# Patient Record
Sex: MALE | Race: OTHER | HISPANIC OR LATINO | URBAN - METROPOLITAN AREA
[De-identification: names, ages, dates, MRNs, and addresses within clinical notes are randomized per-mention and may not be internally consistent; named-entity substitution may affect disease eponyms.]

---

## 2024-02-14 ENCOUNTER — EMERGENCY (EMERGENCY)
Facility: HOSPITAL | Age: 34
LOS: 1 days | Discharge: HOME | End: 2024-02-14
Attending: EMERGENCY MEDICINE | Admitting: EMERGENCY MEDICINE
Payer: SELF-PAY

## 2024-02-14 VITALS
DIASTOLIC BLOOD PRESSURE: 74 MMHG | TEMPERATURE: 98 F | HEART RATE: 68 BPM | OXYGEN SATURATION: 99 % | RESPIRATION RATE: 16 BRPM | SYSTOLIC BLOOD PRESSURE: 128 MMHG

## 2024-02-14 VITALS
OXYGEN SATURATION: 100 % | HEART RATE: 69 BPM | SYSTOLIC BLOOD PRESSURE: 115 MMHG | DIASTOLIC BLOOD PRESSURE: 55 MMHG | RESPIRATION RATE: 16 BRPM | TEMPERATURE: 98 F

## 2024-02-14 LAB
ALBUMIN SERPL ELPH-MCNC: 4.2 G/DL — SIGNIFICANT CHANGE UP (ref 3.3–5)
ALP SERPL-CCNC: 78 U/L — SIGNIFICANT CHANGE UP (ref 40–120)
ALT FLD-CCNC: 16 U/L — SIGNIFICANT CHANGE UP (ref 4–41)
ANION GAP SERPL CALC-SCNC: 11 MMOL/L — SIGNIFICANT CHANGE UP (ref 7–14)
AST SERPL-CCNC: 10 U/L — SIGNIFICANT CHANGE UP (ref 4–40)
BASOPHILS # BLD AUTO: 0.05 K/UL — SIGNIFICANT CHANGE UP (ref 0–0.2)
BASOPHILS NFR BLD AUTO: 0.4 % — SIGNIFICANT CHANGE UP (ref 0–2)
BILIRUB SERPL-MCNC: 0.6 MG/DL — SIGNIFICANT CHANGE UP (ref 0.2–1.2)
BUN SERPL-MCNC: 17 MG/DL — SIGNIFICANT CHANGE UP (ref 7–23)
CALCIUM SERPL-MCNC: 9.1 MG/DL — SIGNIFICANT CHANGE UP (ref 8.4–10.5)
CHLORIDE SERPL-SCNC: 105 MMOL/L — SIGNIFICANT CHANGE UP (ref 98–107)
CO2 SERPL-SCNC: 25 MMOL/L — SIGNIFICANT CHANGE UP (ref 22–31)
CREAT SERPL-MCNC: 0.87 MG/DL — SIGNIFICANT CHANGE UP (ref 0.5–1.3)
EGFR: 117 ML/MIN/1.73M2 — SIGNIFICANT CHANGE UP
EOSINOPHIL # BLD AUTO: 0.04 K/UL — SIGNIFICANT CHANGE UP (ref 0–0.5)
EOSINOPHIL NFR BLD AUTO: 0.3 % — SIGNIFICANT CHANGE UP (ref 0–6)
FLUAV AG NPH QL: SIGNIFICANT CHANGE UP
FLUBV AG NPH QL: SIGNIFICANT CHANGE UP
GLUCOSE SERPL-MCNC: 139 MG/DL — HIGH (ref 70–99)
HCT VFR BLD CALC: 43 % — SIGNIFICANT CHANGE UP (ref 39–50)
HGB BLD-MCNC: 15 G/DL — SIGNIFICANT CHANGE UP (ref 13–17)
IANC: 9.5 K/UL — HIGH (ref 1.8–7.4)
IMM GRANULOCYTES NFR BLD AUTO: 0.6 % — SIGNIFICANT CHANGE UP (ref 0–0.9)
LYMPHOCYTES # BLD AUTO: 1.11 K/UL — SIGNIFICANT CHANGE UP (ref 1–3.3)
LYMPHOCYTES # BLD AUTO: 9.6 % — LOW (ref 13–44)
MAGNESIUM SERPL-MCNC: 1.9 MG/DL — SIGNIFICANT CHANGE UP (ref 1.6–2.6)
MCHC RBC-ENTMCNC: 31.5 PG — SIGNIFICANT CHANGE UP (ref 27–34)
MCHC RBC-ENTMCNC: 34.9 GM/DL — SIGNIFICANT CHANGE UP (ref 32–36)
MCV RBC AUTO: 90.3 FL — SIGNIFICANT CHANGE UP (ref 80–100)
MONOCYTES # BLD AUTO: 0.78 K/UL — SIGNIFICANT CHANGE UP (ref 0–0.9)
MONOCYTES NFR BLD AUTO: 6.8 % — SIGNIFICANT CHANGE UP (ref 2–14)
NEUTROPHILS # BLD AUTO: 9.5 K/UL — HIGH (ref 1.8–7.4)
NEUTROPHILS NFR BLD AUTO: 82.3 % — HIGH (ref 43–77)
NRBC # BLD: 0 /100 WBCS — SIGNIFICANT CHANGE UP (ref 0–0)
NRBC # FLD: 0 K/UL — SIGNIFICANT CHANGE UP (ref 0–0)
NT-PROBNP SERPL-SCNC: <36 PG/ML — SIGNIFICANT CHANGE UP
PLATELET # BLD AUTO: 322 K/UL — SIGNIFICANT CHANGE UP (ref 150–400)
POTASSIUM SERPL-MCNC: 3.9 MMOL/L — SIGNIFICANT CHANGE UP (ref 3.5–5.3)
POTASSIUM SERPL-SCNC: 3.9 MMOL/L — SIGNIFICANT CHANGE UP (ref 3.5–5.3)
PROT SERPL-MCNC: 7.3 G/DL — SIGNIFICANT CHANGE UP (ref 6–8.3)
RBC # BLD: 4.76 M/UL — SIGNIFICANT CHANGE UP (ref 4.2–5.8)
RBC # FLD: 11.8 % — SIGNIFICANT CHANGE UP (ref 10.3–14.5)
RSV RNA NPH QL NAA+NON-PROBE: SIGNIFICANT CHANGE UP
SARS-COV-2 RNA SPEC QL NAA+PROBE: SIGNIFICANT CHANGE UP
SODIUM SERPL-SCNC: 141 MMOL/L — SIGNIFICANT CHANGE UP (ref 135–145)
TROPONIN T, HIGH SENSITIVITY RESULT: <6 NG/L — SIGNIFICANT CHANGE UP
WBC # BLD: 11.55 K/UL — HIGH (ref 3.8–10.5)
WBC # FLD AUTO: 11.55 K/UL — HIGH (ref 3.8–10.5)

## 2024-02-14 PROCEDURE — 93010 ELECTROCARDIOGRAM REPORT: CPT

## 2024-02-14 PROCEDURE — 71046 X-RAY EXAM CHEST 2 VIEWS: CPT | Mod: 26

## 2024-02-14 PROCEDURE — 99285 EMERGENCY DEPT VISIT HI MDM: CPT

## 2024-02-14 PROCEDURE — 99053 MED SERV 10PM-8AM 24 HR FAC: CPT

## 2024-02-14 RX ORDER — ONDANSETRON 8 MG/1
4 TABLET, FILM COATED ORAL ONCE
Refills: 0 | Status: COMPLETED | OUTPATIENT
Start: 2024-02-14 | End: 2024-02-14

## 2024-02-14 RX ORDER — FAMOTIDINE 10 MG/ML
20 INJECTION INTRAVENOUS ONCE
Refills: 0 | Status: COMPLETED | OUTPATIENT
Start: 2024-02-14 | End: 2024-02-14

## 2024-02-14 RX ADMIN — FAMOTIDINE 20 MILLIGRAM(S): 10 INJECTION INTRAVENOUS at 02:32

## 2024-02-14 RX ADMIN — ONDANSETRON 4 MILLIGRAM(S): 8 TABLET, FILM COATED ORAL at 02:32

## 2024-02-14 RX ADMIN — Medication 30 MILLILITER(S): at 02:32

## 2024-02-14 NOTE — ED PROVIDER NOTE - CLINICAL SUMMARY MEDICAL DECISION MAKING FREE TEXT BOX
Michael PGY3: Healthy 34yo M with no PCP presents for chest tightness - no known cardiac hx but doesn't see providers. Appears anxious. Differential Diagnosis includes but not limited to anxiety/panic attack vs r/o acs vs arrythymia vs GI cause. Give GI meds, monitor on tele, cxr, labs including trop. If neg would dc with  and cards f/u.
no

## 2024-02-14 NOTE — ED ADULT NURSE NOTE - OBJECTIVE STATEMENT
Break RN: Pt is a 32 y/o Male, A&Ox4, ambulatory with no reported PHx. Pt presents to the ED with c/o mid chest tightness that started about an hour ago. Pt also endorses slight nausea. Pt appears anxious. Dr. Nancy Juarez at bedside for eval. Respirations even and unlabored, chest rise equal b/l. Pt placed on bedside cardiac monitor. NSR noted on monitor. Pt denies palpitations, SOB, fever, cough, chills, abdominal pain, V/D, h/a, dizziness, numbness/tingling or any urinary symptoms at this time. Pt awaiting MD orders. No acute distress noted. Safety maintained throughout. Break RN: Pt is a 34 y/o Male, A&Ox4, ambulatory with no reported PHx. Pt presents to the ED with c/o midchest/epigastric pain/tightness that started about an hour ago. Pt also endorses slight nausea and SOB. Pt appears anxious, jittery and endorses slight anxiety at this time. Dr. Nancy Juarez at bedside for eval. Respirations even and unlabored, chest rise equal b/l. Pt placed on bedside cardiac monitor. NSR noted on monitor. Pt denies palpitations, fever, cough, chills, abdominal pain, V/D, h/a, dizziness, numbness/tingling or any urinary symptoms at this time. 20g IVL placed in LAC. Labs collected and sent. Medication administered as ordered, see MAR. No acute distress noted. Safety maintained throughout.

## 2024-02-14 NOTE — ED ADULT TRIAGE NOTE - PAIN: PRESENCE, MLM
Skin normal color for race, warm, dry and intact. No evidence of rash.
chest/complains of pain/discomfort

## 2024-02-14 NOTE — ED PROVIDER NOTE - ATTENDING CONTRIBUTION TO CARE
HPI: 33-year-old male with no past medical history and no past surgical history although has not seen a doctor in many years that presents with chest tightness.  Patient states that he ate dinner approximately 7 to 8 PM and went to lay down for bed at approximately 11 PM and started having chest tightness and having epigastric pain rating to his chest started on the left side then to the right side and felt like he had difficulty breathing and nausea but no vomiting.  He got up and pace around and got anxious about what was happening and thought that his life was good and and decided to come to the ED for evaluation.  Patient states that he had never had this before and is concerned he had a panic attack but felt like he was going to die at the time.  Denies any previous issues with this.  Denies any cocaine use no other IV drug use no smoking or drinking otherwise.  Patient states that he has a history of GERD in the past and this does not feel the same way although has not had the symptoms for a long time.  Tonight he ate some [place] prior to laying down.  Denies any recent fevers, chills, vomiting, diarrhea.  No vision or hearing changes and no paresthesias in any of his 4 extremities.  Patient has no recent trauma or rashes or travel.  No known family history of early MIs or early death.    EXAM: Well-appearing but anxious appearing.  No acute distress speaking full sentences.  Heart is regular rate and rhythm without murmurs rubs or gallops.  Lungs are clear to auscultation bilaterally.  Abdomen is soft and nontender negative Mann's negative McBurney's.  No CVA tenderness bilaterally.  No pitting edema bilateral lower extremities.  No JVD.  No carotid bruits.  Nontender back.  No pulsatile mass in abdomen.    MDM: 33-year-old male with no past medical history no past surgical history and not a cocaine user that presents with chest tightness that started approximately 1 hour prior to arrival when laying down to go to bed.  Patient EKG taken in triage reviewed by me shows normal sinus rhythm without ischemic changes or arrhythmias.  Patient has a relatively benign exam and his vital signs are within normal limits.  He is not tachycardic and he is not hypertensive.  Patient has no risk factors for ACS or dissection although has not seen a doctor in many years.  At this time will workup for ACS with labs and imaging but also provide GI cocktail as this is most likely GERD related versus gastritis versus hiatal hernia given that he has a history of this for a long time and his pain started in the epigastric area.  Will provide meds IV fluids keep on cardiac monitor and reassess.  Upon discharge will provide work note and have discharge planning help patient obtain a PMD and I encouraged him to strongly find one himself as he does have insurance that he can use to find a PMD as needed.

## 2024-02-14 NOTE — ED PROVIDER NOTE - PROGRESS NOTE DETAILS
Michael PGY3: Pain has improved. Will dc with pcp f/u. Michael PGY3: Pain has improved. Will dc with pcp f/u. cxr reviewed, no acute ptx or infiltrates or acute causes for cp appreciated.

## 2024-02-14 NOTE — ED PROVIDER NOTE - NSFOLLOWUPCLINICS_GEN_ALL_ED_FT
Cardiology at [facility]  Cardiology  [address] [place] [id number]  Phone: [phone]  Fax:     Cardiology at [facility]  Cardiology  [address] [place] [address]  Phone: [phone]  Fax:     [facility] Internal Medicine  General Internal Medicine  [address] [place] [address]  Phone: [phone]  Fax:

## 2024-02-14 NOTE — ED PROVIDER NOTE - PATIENT PORTAL LINK FT
You can access the FollowMyHealth Patient Portal offered by [facility] by registering at the following website: [email or web address]. By joining [employer]’s FollowMyHealth portal, you will also be able to view your health information using other applications (apps) compatible with our system.

## 2024-02-14 NOTE — ED ADULT NURSE REASSESSMENT NOTE - NS ED NURSE REASSESS COMMENT FT1
Report given by previous RN. Pt returned from xray. Pt is normal sinus on cardiac monitor. Pt breathing is equal and nonlabored. Pt states his chest pain is better. Pt safety maintained.

## 2024-02-14 NOTE — ED PROVIDER NOTE - ADDITIONAL NOTES AND INSTRUCTIONS:
To Whom it May Concern - Patient seen and evaluated in Emergency Room. Please excuse the above individual for medical care and recovery until date above. Thank you for you care. - Nancy Rodriguez MD.

## 2024-02-14 NOTE — ED PROVIDER NOTE - PHYSICAL EXAMINATION
CONSTITUTIONAL: anxious appearing but in NAD  SKIN: mildly diaphoretic   HEAD: NCAT  EYES: NL inspection  ENT: MMM  NECK: Supple; non tender.  CARD: RRR, no murmurs appreciated, pulse regular  RESP: CTAB  ABD: S/NT no R/G  EXT: no LE edema  NEURO: Grossly unremarkable  PSYCH: Cooperative, appropriate.

## 2024-02-14 NOTE — ED PROVIDER NOTE - OBJECTIVE STATEMENT
Healthy 34yo M with no PCP presents for chest tightness. Started ~1hr while he was lying down, felt like he was having a 'panic attack'. Felt his L chest get tight and felt he couldn't expand, anxious, mildly diaphoretic and nausea. No vomiting, abd pain, cough, fever, recent URI sxs or worsening symptoms on deep breathing. Tight sensation has improved since arrival but still present. No hx of anxiety, thyroid issues or drug use.